# Patient Record
Sex: MALE | Race: WHITE
[De-identification: names, ages, dates, MRNs, and addresses within clinical notes are randomized per-mention and may not be internally consistent; named-entity substitution may affect disease eponyms.]

---

## 2018-04-18 ENCOUNTER — HOSPITAL ENCOUNTER (EMERGENCY)
Dept: HOSPITAL 97 - ER | Age: 53
Discharge: HOME | End: 2018-04-18
Payer: COMMERCIAL

## 2018-04-18 DIAGNOSIS — E78.00: ICD-10-CM

## 2018-04-18 DIAGNOSIS — R53.1: ICD-10-CM

## 2018-04-18 DIAGNOSIS — E86.0: Primary | ICD-10-CM

## 2018-04-18 LAB
ALBUMIN SERPL BCP-MCNC: 4.1 G/DL (ref 3.2–5.5)
ALP SERPL-CCNC: 43 IU/L (ref 42–121)
ALT SERPL W P-5'-P-CCNC: 20 IU/L (ref 10–60)
AST SERPL W P-5'-P-CCNC: 21 IU/L (ref 10–42)
BUN BLD-MCNC: 20 MG/DL (ref 6–20)
GLUCOSE SERPLBLD-MCNC: 157 MG/DL (ref 65–120)
HCT VFR BLD CALC: 45.9 % (ref 39.6–49)
INR BLD: 0.97
LYMPHOCYTES # SPEC AUTO: 1 K/UL (ref 0.7–4.9)
MAGNESIUM SERPL-MCNC: 1.9 MG/DL (ref 1.8–2.5)
MCH RBC QN AUTO: 25.6 PG (ref 27–35)
MCV RBC: 79.7 FL (ref 80–100)
PMV BLD: 8.5 FL (ref 7.6–11.3)
POTASSIUM SERPL-SCNC: 4 MEQ/L (ref 3.6–5)
RBC # BLD: 5.76 M/UL (ref 4.33–5.43)

## 2018-04-18 PROCEDURE — 96360 HYDRATION IV INFUSION INIT: CPT

## 2018-04-18 PROCEDURE — 85610 PROTHROMBIN TIME: CPT

## 2018-04-18 PROCEDURE — 96361 HYDRATE IV INFUSION ADD-ON: CPT

## 2018-04-18 PROCEDURE — 80048 BASIC METABOLIC PNL TOTAL CA: CPT

## 2018-04-18 PROCEDURE — 83735 ASSAY OF MAGNESIUM: CPT

## 2018-04-18 PROCEDURE — 83880 ASSAY OF NATRIURETIC PEPTIDE: CPT

## 2018-04-18 PROCEDURE — 82550 ASSAY OF CK (CPK): CPT

## 2018-04-18 PROCEDURE — 84484 ASSAY OF TROPONIN QUANT: CPT

## 2018-04-18 PROCEDURE — 80076 HEPATIC FUNCTION PANEL: CPT

## 2018-04-18 PROCEDURE — 71045 X-RAY EXAM CHEST 1 VIEW: CPT

## 2018-04-18 PROCEDURE — 81003 URINALYSIS AUTO W/O SCOPE: CPT

## 2018-04-18 PROCEDURE — 36415 COLL VENOUS BLD VENIPUNCTURE: CPT

## 2018-04-18 PROCEDURE — 85025 COMPLETE CBC W/AUTO DIFF WBC: CPT

## 2018-04-18 PROCEDURE — 93005 ELECTROCARDIOGRAM TRACING: CPT

## 2018-04-18 PROCEDURE — 82962 GLUCOSE BLOOD TEST: CPT

## 2018-04-18 PROCEDURE — 99284 EMERGENCY DEPT VISIT MOD MDM: CPT

## 2018-04-18 NOTE — ER
Nurse's Notes                                                                                     

 [facility]                                                                

Name: Patrica Servin                                                                                 

Age: 52 yrs                                                                                       

Sex: Male                                                                                         

: 1965                                                                                   

MRN: [id number]                                                                                   

Arrival Date: 2018                                                                          

Time: 07:46                                                                                       

Account#: [id number]                                                                            

Bed 19                                                                                            

Private MD:                                                                                       

Diagnosis: Palpitations;Weakness;Dehydration                                                      

                                                                                                  

Presentation:                                                                                     

                                                                                             

07:46 Presenting complaint: EMS states: called out for feeling "woozy and light headed"       em  

      reports nausea, denies vomiting, hx of diabetes, BGL on scene 134. EMS /80,           

      100HR, SPO2 100%, denies pain. Transition of care: patient was not received from            

      another setting of care. Onset of symptoms was 2018. Initial Sepsis Screen:       

      Does the patient meet any 2 criteria? No. Patient's initial sepsis screen is negative.      

      Does the patient have a suspected source of infection? No. Patient's initial sepsis         

      screen is negative. Care prior to arrival: Medication(s) given: Normal saline infusion,     

      200 mL.                                                                                     

07:46 Method Of Arrival: EMS: [facility]                                                       em  

07:48 Acuity: MUKESH 3                                                                           iw  

                                                                                                  

Triage Assessment:                                                                                

07:51 General: Appears in no apparent distress. comfortable, well groomed, Behavior is calm,  em  

      cooperative. Pain: Denies pain.                                                             

                                                                                                  

Historical:                                                                                       

- Allergies:                                                                                      

07:53 No Known Allergies;                                                                     em  

- PMHx:                                                                                           

07:53 Irregular heart rate; High Cholesterol;                                                 em  

- PSHx:                                                                                           

07:53 vesectomy;                                                                              em  

                                                                                                  

- Immunization history:: Adult Immunizations up to date.                                          

- Social history:: Smoking status: Patient/guardian denies using tobacco.                         

                                                                                                  

                                                                                                  

Screenin:50 Abuse screen: Denies threats or abuse. Nutritional screening: No deficits noted.        em  

      Tuberculosis screening: No symptoms or risk factors identified. Fall Risk None              

      identified.                                                                                 

                                                                                                  

Assessment:                                                                                       

07:50 General: Appears in no apparent distress. comfortable, Behavior is calm, cooperative,   em  

      appropriate for age. Pain: Denies pain. Neuro: Level of Consciousness is awake, alert,      

      obeys commands, Oriented to person, place, time, situation,  are equal bilaterally     

      Moves all extremities. Speech is normal, Facial symmetry appears normal, Intact Reports     

      dizziness, light headed. Cardiovascular: Heart tones S1 S2 present Capillary refill < 3     

      seconds Patient's skin is warm and dry. Rhythm is sinus rhythm. Respiratory: Airway is      

      patent Respiratory effort is even, unlabored, Respiratory pattern is regular,               

      symmetrical. GI: Abdomen is flat. : No signs and/or symptoms were reported regarding      

      the genitourinary system. EENT: No signs and/or symptoms were reported regarding the        

      EENT system. Derm: Skin is intact, Skin is pink, warm \T\ dry. Musculoskeletal: Range of    

      motion: intact in all extremities.                                                          

08:00 Reassessment: Patient appears in no apparent distress at this time. I agree with above  iw  

      assessment by Radames Dietz LVN.                                                             

08:48 Reassessment: Patient appears in no apparent distress at this time. Patient and/or      em  

      family updated on plan of care and expected duration. Pain level reassessed. Patient is     

      alert, oriented x 3, equal unlabored respirations, skin warm/dry/pink. Patient states       

      feeling better. Patient states symptoms have improved.                                      

09:50 Reassessment: Patient appears in no apparent distress at this time. Patient and/or      em  

      family updated on plan of care and expected duration. Pain level reassessed. Patient is     

      alert, oriented x 3, equal unlabored respirations, skin warm/dry/pink. Patient denies       

      pain at this time. Patient states feeling better. Patient states symptoms have improved.    

                                                                                                  

Vital Signs:                                                                                      

07:51  / 79; Pulse 101; Resp 18; Temp 97.8; Pulse Ox 100% on R/A; Weight 83.91 kg;      em  

      Height 5 ft. 10 in. (177.80 cm); Pain 0/10;                                                 

07:57  / 78 Supine; Pulse 89; Resp 16; Pulse Ox 98% on R/A;                             mh5 

07:59  / 88 Sitting; Pulse 93; Resp 17; Pulse Ox 100% on R/A;                           mh5 

08:00  / 87 Standing; Pulse 94; Resp 16; Pulse Ox 99% on R/A;                           mh5 

09:00  / 81; Pulse 78; Resp 16; Pulse Ox 100% on R/A; Pain 0/10;                        em  

10:03  / 75; Pulse 81; Resp 18; Pulse Ox 99% on R/A; Pain 0/10;                         em  

07:51 Body Mass Index 26.54 (83.91 kg, 177.80 cm)                                             em  

                                                                                                  

ED Course:                                                                                        

07:46 Patient arrived in ED.                                                                  em  

07:47 Long Yates PA is PHCP.                                                               8 

07:47 Ruid Sneed MD is Attending Physician.                                             [id number] 

07:48 Triage completed.                                                                       iw  

07:50 Patient has correct armband on for positive identification. Bed in low position. Call   em  

      light in reach. Side rails up X2.                                                           

07:50 No provider procedures requiring assistance completed. Maintain EMS IV. Dressing        em  

      intact. Good blood return noted. Site clean \T\ dry. Gauge \T\ site: 20 G RAC.              

07:53 Arm band placed on.                                                                     em  

07:54 Radames Dietz LVN is Primary Nurse.                                                     em  

08:22 EKG done, by EKG tech. reviewed by Long DOTSON.                                      tc  

08:29 X-ray completed. Portable x-ray completed in exam room. Patient tolerated procedure     ag1 

      well.                                                                                       

08:31 XRAY Chest (1 view) In Process Unspecified.                                             EDMS

10:21 IV discontinued, intact, bleeding controlled, No redness/swelling at site. Pressure     em  

      dressing applied.                                                                           

                                                                                                  

Administered Medications:                                                                         

08:30 Drug: NS 0.9% 1000 ml Route: IV; Rate: 1000 ml; Site: left antecubital;                 em  

10:06 Follow up: IV Status: Completed infusion; IV Intake: 1000ml                             em  

                                                                                                  

                                                                                                  

Point of Care Testing:                                                                            

      Blood Glucose:                                                                              

08:06 Blood Glucose: 137 mg/dL;                                                               mh5 

      Ranges:                                                                                     

                                                                                                  

Intake:                                                                                           

10:06 IV: 1000ml; Total: 1000ml.                                                              em  

                                                                                                  

Outcome:                                                                                          

09:49 Discharge ordered by MD.                                                                [id number] 

10:22 Discharged to home ambulatory, with family.                                             em  

10:22 Condition: good                                                                             

10:22 Discharge instructions given to patient, family, Instructed on discharge instructions,      

      follow up and referral plans. medication usage, Demonstrated understanding of               

      instructions, follow-up care.                                                               

10:22 Patient left the ED.                                                                    em  

                                                                                                  

Signatures:                                                                                       

Dispatcher [employer]                           EDMS                                                 

Radames Dietz LVN LVN  em                                                   

Shannon Broussard RN                     RN   Long Mccracken PA PA   jr8                                                  

Alyson Calles, EKG Tech               EKG Ttc                                                   

Maria Elena Cash                             ag1                                                  

Sakshi Bhatti                              5                                                  

                                                                                                  

Corrections: (The following items were deleted from the chart)                                    

10:20 07:51  / 79; Pulse 101bpm; Resp 18bpm; Pulse Ox 100% RA; 83.91 kg; Height 5 ft.   em  

      10 in.; BMI: 26.5; Pain 0/10; em                                                            

                                                                                                  

**************************************************************************************************

## 2018-04-18 NOTE — EDPHYS
Physician Documentation                                                                           

 [facility]                                                                

Name: Patrica Servin                                                                                 

Age: 52 yrs                                                                                       

Sex: Male                                                                                         

: 1965                                                                                   

MRN: [id number]                                                                                   

Arrival Date: 2018                                                                          

Time: 07:46                                                                                       

Account#: [id number]                                                                            

Bed 19                                                                                            

Private MD:                                                                                       

ED Physician Rudi Sneed                                                                      

HPI:                                                                                              

                                                                                             

08:50 This 52 yrs old  Male presents to ER via EMS with complaints of weakness.      jr8 

08:50 Patient stated that while at work felt weak and "not right". Stated that he feels       jr8 

      drained. Noticed that he has palpations on/off. History of that and was been seen by        

      cardiology and told that it is benign but noticed he is getting it more frequently. Had     

      had increased shortness of breath with exertion today as well . Severity of symptoms:       

      At their worst the symptoms were moderate in the emergency department the symptoms are      

      unchanged. The patient has not experienced similar symptoms in the past. The patient        

      has not recently seen a physician.                                                          

                                                                                                  

Historical:                                                                                       

- Allergies:                                                                                      

07:53 No Known Allergies;                                                                     em  

- PMHx:                                                                                           

07:53 Irregular heart rate; High Cholesterol;                                                 em  

- PSHx:                                                                                           

07:53 vesectomy;                                                                              em  

                                                                                                  

- Immunization history:: Adult Immunizations up to date.                                          

- Social history:: Smoking status: Patient/guardian denies using tobacco.                         

                                                                                                  

                                                                                                  

ROS:                                                                                              

08:50 Eyes: Negative for injury, pain, redness, and discharge, ENT: Negative for injury,      jr8 

      pain, and discharge, Neck: Negative for injury, pain, and swelling, Abdomen/GI:             

      Negative for abdominal pain, nausea, vomiting, diarrhea, and constipation, Back:            

      Negative for injury and pain, MS/Extremity: Negative for injury and deformity, Skin:        

      Negative for injury, rash, and discoloration, Neuro: Negative for headache, weakness,       

      numbness, tingling, and seizure.                                                            

08:50 Cardiovascular: Positive for palpitations, Negative for chest pain, edema, orthopnea,       

      paroxysmal nocturnal dyspnea.                                                               

08:50 Respiratory: Positive for dyspnea on exertion, Negative for cough, hemoptysis,              

      orthopnea, pleurisy, sputum production, wheezing.                                           

                                                                                                  

Exam:                                                                                             

08:50 Eyes:  Pupils equal round and reactive to light, extra-ocular motions intact.  Lids and jr8 

      lashes normal.  Conjunctiva and sclera are non-icteric and not injected.  Cornea within     

      normal limits.  Periorbital areas with no swelling, redness, or edema. ENT:  Nares          

      patent. No nasal discharge, no septal abnormalities noted.  Tympanic membranes are          

      normal and external auditory canals are clear.  Oropharynx with no redness, swelling,       

      or masses, exudates, or evidence of obstruction, uvula midline.  Mucous membranes           

      moist. Neck:  Trachea midline, no thyromegaly or masses palpated, and no cervical           

      lymphadenopathy.  Supple, full range of motion without nuchal rigidity, or vertebral        

      point tenderness.  No Meningismus. Cardiovascular:  Regular rate and rhythm with a          

      normal S1 and S2.  No gallops, murmurs, or rubs.  Normal PMI, no JVD.  No pulse             

      deficits. Respiratory:  Lungs have equal breath sounds bilaterally, clear to                

      auscultation and percussion.  No rales, rhonchi or wheezes noted.  No increased work of     

      breathing, no retractions or nasal flaring. Abdomen/GI:  Soft, non-tender, with normal      

      bowel sounds.  No distension or tympany.  No guarding or rebound.  No evidence of           

      tenderness throughout. Back:  No spinal tenderness.  No costovertebral tenderness.          

      Full range of motion. Skin:  Warm, dry with normal turgor.  Normal color with no            

      rashes, no lesions, and no evidence of cellulitis. MS/ Extremity:  Pulses equal, no         

      cyanosis.  Neurovascular intact.  Full, normal range of motion. Neuro:  Awake and           

      alert, GCS 15, oriented to person, place, time, and situation.  Cranial nerves II-XII       

      grossly intact.  Motor strength 5/5 in all extremities.  Sensory grossly intact.            

      Cerebellar exam normal.  Normal gait.                                                       

                                                                                                  

Vital Signs:                                                                                      

07:51  / 79; Pulse 101; Resp 18; Temp 97.8; Pulse Ox 100% on R/A; Weight 83.91 kg;      em  

      Height 5 ft. 10 in. (177.80 cm); Pain 0/10;                                                 

07:57  / 78 Supine; Pulse 89; Resp 16; Pulse Ox 98% on R/A;                             mh5 

07:59  / 88 Sitting; Pulse 93; Resp 17; Pulse Ox 100% on R/A;                           mh5 

08:00  / 87 Standing; Pulse 94; Resp 16; Pulse Ox 99% on R/A;                           mh5 

09:00  / 81; Pulse 78; Resp 16; Pulse Ox 100% on R/A; Pain 0/10;                        em  

10:03  / 75; Pulse 81; Resp 18; Pulse Ox 99% on R/A; Pain 0/10;                         em  

07:51 Body Mass Index 26.54 (83.91 kg, 177.80 cm)                                             em  

                                                                                                  

MDM:                                                                                              

07:47 Patient medically screened.                                                             [facility] 

09:48 Data reviewed: vital signs, nurses notes, lab test result(s), EKG, radiologic studies,  jr8 

      plain films, and as a result, I will discharge patient. Data interpreted: Pulse             

      oximetry: on room air is 99 %. Interpretation: normal. Counseling: I had a detailed         

      discussion with the patient and/or guardian regarding: the historical points, exam          

      findings, and any diagnostic results supporting the discharge/admit diagnosis, lab          

      results, radiology results, the need for outpatient follow up, a cardiologist, a family     

      practitioner, to return to the emergency department if symptoms worsen or persist or if     

      there are any questions or concerns that arise at home. Response to treatment: the          

      patient's symptoms have markedly improved after treatment, patient is well hydrated.        

                                                                                                  

                                                                                             

07:54 Order name: Basic Metabolic Panel; Complete Time: 09:23                                                                                                                              

07:54 Order name: BNP; Complete Time: 09:27                                                                                                                                                

07:54 Order name: CBC with Diff; Complete Time: 09:14                                                                                                                                      

07:54 Order name: CPK; Complete Time: 09:23                                                                                                                                                

07:54 Order name: LFT's; Complete Time: 09:23                                                                                                                                              

07:54 Order name: Magnesium; Complete Time: 09:23                                                                                                                                          

07:54 Order name: PT-INR; Complete Time: 09:14                                                                                                                                             

07:54 Order name: Troponin (emerg Dept Use Only); Complete Time: 09:23                                                                                                                     

07:54 Order name: XRAY Chest (1 view); Complete Time: 09:37                                                                                                                                

07:54 Order name: EKG; Complete Time: 07:55                                                                                                                                                

07:54 Order name: Cardiac monitoring; Complete Time: 08:39                                                                                                                                 

07:54 Order name: EKG - Nurse/Tech; Complete Time: 08:39                                                                                                                                   

08:23 Order name: Urine Dipstick--Ancillary (enter results); Complete Time: 10:04             2 

                                                                                             

07:54 Order name: IV Saline Lock; Complete Time: 08:39                                                                                                                                     

07:54 Order name: Labs collected and sent; Complete Time: 08:39                                                                                                                            

07:54 Order name: O2 Per Protocol; Complete Time: 08:39                                                                                                                                    

07:54 Order name: O2 Sat Monitoring; Complete Time: 08:39                                                                                                                                  

07:54 Order name: Urine Dipstick-Ancillary (obtain specimen); Complete Time: 08:40                                                                                                         

07:54 Order name: Orthostatics; Complete Time: 08:39                                           

                                                                                                  

Administered Medications:                                                                         

08:30 Drug: NS 0.9% 1000 ml Route: IV; Rate: 1000 ml; Site: left antecubital;                 em  

10:06 Follow up: IV Status: Completed infusion; IV Intake: 1000ml                             em  

                                                                                                  

                                                                                                  

Point of Care Testing:                                                                            

      Blood Glucose:                                                                              

08:06 Blood Glucose: 137 mg/dL;                                                               mh5 

      Ranges:                                                                                     

      Critical Glucose Levels:Adult <50 mg/dl or >400 mg/dl  <40 mg/dl or >180 mg/dl       

Disposition:                                                                                      

18 09:49 Discharged to Home. Impression: Palpitations, Weakness, Dehydration.               

- Condition is Stable.                                                                            

- Discharge Instructions: Dehydration, Adult, Palpitations, Weakness, Fatigue.                    

                                                                                                  

- Medication Reconciliation Form, Thank You Letter, Antibiotic Education, Prescription            

  Opioid Use form.                                                                                

- Follow up: Private Physician; When: 2 - 3 days; Reason: Recheck today's complaints,             

  Continuance of care, Re-evaluation by your physician.                                           

- Problem is new.                                                                                 

- Symptoms have improved.                                                                         

                                                                                                  

                                                                                                  

                                                                                                  

Addendum:                                                                                         

2018                                                                                        

     06:09 Co-signature as Attending Physician, Rudi Sneed MD Available for consultation at    p
s1

           all times. .                                                                           

                                                                                                  

Signatures:                                                                                       

Dispatcher [employer]                           [employer] Fan, GUERON                       LVN  em                                                   

Long Yates PA                        PA   [id number]                                                  

Rudi Sneed MD MD   ps1                                                  

                                                                                                  

**************************************************************************************************

## 2018-04-18 NOTE — EKG
Test Date:    2018-04-18               Test Time:    08:15:35

Technician:   VIRAL                                    

                                                     

MEASUREMENT RESULTS:                                       

Intervals:                                           

Rate:         87                                     

TX:           128                                    

QRSD:         96                                     

QT:           360                                    

QTc:          433                                    

Axis:                                                

P:            71                                     

TX:           128                                    

QRS:          66                                     

T:            7                                      

                                                     

INTERPRETIVE STATEMENTS:                                       

                                                     

Normal sinus rhythm

T wave abnormality, consider inferior ischemia

Abnormal ECG

No previous ECG available for comparison



Electronically Signed On 04-18-18 09:48:52 CDT by French Mckeon

## 2018-04-18 NOTE — RAD REPORT
EXAM DESCRIPTION:  RAD - Chest Single View - 4/18/2018 8:35 am

 

CLINICAL HISTORY:  Chest pain, diabetes

 

COMPARISON:  None.

 

FINDINGS:  Portable technique limits examination quality.

 

The lungs are grossly clear. The heart is normal in size. No displaced fractures.

 

IMPRESSION:  No acute intrathoracic process suspected.